# Patient Record
Sex: FEMALE | Race: WHITE | NOT HISPANIC OR LATINO | Employment: STUDENT | URBAN - METROPOLITAN AREA
[De-identification: names, ages, dates, MRNs, and addresses within clinical notes are randomized per-mention and may not be internally consistent; named-entity substitution may affect disease eponyms.]

---

## 2024-11-06 ENCOUNTER — OFFICE VISIT (OUTPATIENT)
Dept: NEUROLOGY | Facility: CLINIC | Age: 24
End: 2024-11-06

## 2024-11-06 ENCOUNTER — TELEPHONE (OUTPATIENT)
Dept: NEUROLOGY | Facility: CLINIC | Age: 24
End: 2024-11-06

## 2024-11-06 VITALS
OXYGEN SATURATION: 98 % | SYSTOLIC BLOOD PRESSURE: 120 MMHG | DIASTOLIC BLOOD PRESSURE: 88 MMHG | HEIGHT: 67 IN | BODY MASS INDEX: 27.31 KG/M2 | HEART RATE: 91 BPM | TEMPERATURE: 98.6 F | WEIGHT: 174 LBS

## 2024-11-06 DIAGNOSIS — Z87.820 HISTORY OF CONCUSSION: ICD-10-CM

## 2024-11-06 DIAGNOSIS — F41.9 ANXIETY DISORDER: ICD-10-CM

## 2024-11-06 DIAGNOSIS — R53.83 FATIGUE: ICD-10-CM

## 2024-11-06 DIAGNOSIS — H93.19 TINNITUS: ICD-10-CM

## 2024-11-06 DIAGNOSIS — R42 DIZZINESS: ICD-10-CM

## 2024-11-06 DIAGNOSIS — G43.709 CHRONIC MIGRAINE WITHOUT AURA WITHOUT STATUS MIGRAINOSUS, NOT INTRACTABLE: Primary | ICD-10-CM

## 2024-11-06 PROCEDURE — 99205 OFFICE O/P NEW HI 60 MIN: CPT | Performed by: STUDENT IN AN ORGANIZED HEALTH CARE EDUCATION/TRAINING PROGRAM

## 2024-11-06 RX ORDER — FREMANEZUMAB-VFRM 225 MG/1.5ML
225 INJECTION SUBCUTANEOUS
COMMUNITY
End: 2024-11-06 | Stop reason: SDUPTHER

## 2024-11-06 RX ORDER — PROPRANOLOL HYDROCHLORIDE 60 MG/1
60 CAPSULE, EXTENDED RELEASE ORAL
Qty: 30 CAPSULE | Refills: 6 | Status: SHIPPED | OUTPATIENT
Start: 2024-11-06

## 2024-11-06 RX ORDER — FLUOXETINE 40 MG/1
40 CAPSULE ORAL DAILY
COMMUNITY
Start: 2024-08-23 | End: 2025-08-23

## 2024-11-06 RX ORDER — ONDANSETRON 4 MG/1
4 TABLET, ORALLY DISINTEGRATING ORAL EVERY 8 HOURS PRN
COMMUNITY
Start: 2024-10-30

## 2024-11-06 RX ORDER — NORGESTREL AND ETHINYL ESTRADIOL 0.3-0.03MG
1 KIT ORAL DAILY
COMMUNITY

## 2024-11-06 RX ORDER — MECLIZINE HYDROCHLORIDE 25 MG/1
25 TABLET ORAL 3 TIMES DAILY PRN
COMMUNITY
Start: 2024-10-30 | End: 2025-10-30

## 2024-11-06 RX ORDER — FREMANEZUMAB-VFRM 225 MG/1.5ML
225 INJECTION SUBCUTANEOUS
Qty: 1.5 ML | Refills: 11 | Status: SHIPPED | OUTPATIENT
Start: 2024-11-06

## 2024-11-06 NOTE — PROGRESS NOTES
Neurology Ambulatory Visit  Name: Aissatou Aguillon       : 2000       MRN: [id number]   Encounter Provider: Jam Huizar DO   Encounter Date: 2024  Encounter department: [facility]    Aissatou Aguillon is a 24 y.o.  right handed female with a past medical history significant for migraines, who is presenting to the Neurology office for evaluation of headaches.    Assessment and Plan  1. Chronic migraine without aura without status migrainosus, not intractable  Assessment & Plan:  Aissatou is here today for evaluation of chronic migraines that have been lingering for many years.  Her headaches first started approximately 8 years ago after her first concussion.  Of note she has a family history significant for migraines and multiple family members.  It is likely that this injury brought on a headache disorder in her.  She was previously following with the [facility] neurology clinic, but is here today for second opinion.  We discussed a variety of potential options, but she preferred to continue Ajovy monthly injections and I recommended that we add on propranolol nightly.  She will update me in approximately 4 weeks at which point we can consider increasing the dose if needed.  From an abortive standpoint, she will continue with over-the-counter medications at this time.  She has a variety of ongoing symptoms including dizziness and tinnitus.  She is currently being worked up with an MRI and ENT evaluation which I think is appropriate.  It would be beneficial for her to obtain a VNG with ENT as I am suspicious about something like PPPD versus her dizziness being related to persistent headaches.  Furthermore, she reports being significantly fatigued which again could be from her daily headaches, but may also be related to another underlying issue.  She is pending a sleep study as well.  Orders:  -     propranolol (INDERAL LA) 60 mg 24 hr capsule; Take 1 capsule (60 mg total) by mouth  "daily at bedtime  -     Ajovy 225 MG/1.5ML prefilled syringe; Inject 1.5 mL (225 mg total) under the skin every 30 (thirty) days  2. History of concussion  -     Iron Panel (Includes Ferritin, Iron Sat%, Iron, and TIBC); Future  -     Folate; Future  3. Anxiety disorder  4. Fatigue  -     Iron Panel (Includes Ferritin, Iron Sat%, Iron, and TIBC); Future  -     Folate; Future  5. Tinnitus  6. Dizziness      -We have discussed concussions and the natural course of recovery. We have discussed that symptoms from a concussion typically take 2 weeks to resolve, and although sometimes it can feel like concussion symptoms linger on, at this point these symptoms would be related to contributing factors. We also discussed that the course may wax and wane.  - Contributing factors may include:   Prolonged removal from normal routine,  posttraumatic headache,  comorbid injuries, preexisting chronic headaches or migraines, cervicogenic headache, medication overuse headache, preexisting learning disability, history of concussion with prolonged recovery, anxiety or depression, stress, deconditioning,  comorbid medical diagnoses, young age.   - I have recommended gradual return of normal cognitive and physical activity with safety precautions  - We discussed that newer research regarding concussion shows that the sooner one returns gradually to their normal physical and cognitive routine, the sooner one tends to recover. Prolonged removal from normal routine and deconditioning have been shown to prolong symptoms and worsen depression.   - We discussed that sometimes there is a constellation of symptoms that some refer to as \"post concussion syndrome,\" but I prefer not to use this term since that can be misleading and make people think they are still brain injured or \"concussed,\" when the most common and likely etiology this far out from the head trauma is either contributing factors or a form of functional neurologic disorder with " mixed symptoms, especially after a thorough workup to rule out other etiologies since concussion would not be the direct cause at this point.   - We discussed how cognitive issues can have multiple causes and often related to multifactorial etiologies including stress, anxiety,  mood, pain, hypervigilance  and sleep issues and provided reassurance that, it is not likely the cognitive dysfunction is related to concussion at this point.   - Safe driving precautions, should not drive at all if feeling sleepy or cognitively not well.      Workup:  - Neurologic assessment reveals unremarkable neurological exam.  - MRI brain without contrast June 2022: No acute intracranial findings.  No white matter changes.  - MRI brain and IAC  - Sleep study  - ENT eval  - Labs: Iron panel, Folate (plus [facility] ordered labs)    Preventative:  - we discussed headache hygiene and lifestyle factors that may improve headaches  - Propranolol 60mg HS  - Currently on through other providers: Ajovy  - Past/ failed/contraindicated: Topamax (side effects), Nortriptyline (side effects), allergy to Depakote  - future options: Memantine, Diamox, CGRP med, botox    Acute:  - discussed not taking over-the-counter or prescription pain medications more than 3 days per week to prevent medication overuse/rebound headache  - Currently on through other providers: Zofran  - Past/ failed/contraindicated: Sumatriptan (somewhat effective), Rizatriptan (more effective than Sumatriptan), promethazine, Zofran, Compazine, Ubrelvy (helped a bit)  - future options:  Triptan, prochlorperazine, Toradol IM or p.o., could consider trial of dexamethasone 2 mg daily for prolonged migraine, ubrelvy, reyvow, nurtec, zavzpret  Patient instructions   Additional Testing:   - MRI brain  - Sleep study  - Labs: Iron panel, Folate  - Discuss VNG with ENT    Headache Calendar  Please maintain a headache calendar  Consider using phone applications such as Migraine Claude or [employer]i  Migraine Tracker    Headache/migraine treatment:   Acute medications (for immediate treatment of a headache):   It is ok to take ibuprofen, acetaminophen or naproxen (Advil, Tylenol,  Aleve, Excedrin) if they help your headaches you should limit these to No more than 2-3 times a week to avoid medication overuse/rebound headaches.     Prescription preventive medications for headaches/migraines   Propranolol - 60mg at bedtime  - Update me in about 4 weeks  - Continue Ajovy    *Typically these types of medications take time until you see the benefit, although some may see improvement in days, often it may take weeks, especially if the medication is being titrated up to a beneficial level. Please contact us if there are any concerns or questions regarding the medication.     Lifestyle Recommendations:  [x] SLEEP - Maintain a regular sleep schedule: Adults need at least 7-8 hours of uninterrupted a night. Maintain good sleep hygiene:  Going to bed and waking up at consistent times, avoiding excessive daytime naps, avoiding caffeinated beverages in the evening, avoid excessive stimulation in the evening and generally using bed primarily for sleeping.  One hour before bedtime would recommend turning lights down lower, decreasing your activity (may read quietly, listen to music at a low volume). When you get into bed, should eliminate all technology (no texting, emailing, playing with your phone, iPad or tablet in bed).  [x] HYDRATION - Maintain good hydration.  Drink  2L of fluid a day (4 typical small water bottles)  [x] DIET - Maintain good nutrition. In particular don't skip meals and try and eat healthy balanced meals regularly.  [x] TRIGGERS - Look for other triggers and avoid them: Limit caffeine to 1-2 cups a day or less. Avoid dietary triggers that you have noticed bring on your headaches (this could include aged cheese, peanuts, MSG, aspartame and nitrates).  [x] EXERCISE - physical exercise as we all know is good  for you in many ways, and not only is good for your heart, but also is beneficial for your mental health, cognitive health and  chronic pain/headaches. I would encourage at the least 5 days of physical exercise weekly for at least 30 minutes.     Education and Follow-up  [x] Please call with any questions or concerns. Of course if any new concerning symptoms go to the emergency department.  [x] Follow up in 3 months  History of Present Illness:       Pertinent history:  -Patient previously seen by [facility] neurology.  Most recently seen in May 2024.   -She was also seen in her family medicine clinic on 10/30/2024.  Reported a 2-week history of dizziness and ringing in her ear.  Recommended ENT evaluation, ordered labs and imaging  -Of note, she has a prior history of concussion.  Most recent injury was June 2019 while playing volleyball.  She followed with [facility] concussion following that    Headaches started at what age? 16 years old  How often do the headaches occur?   - as of 11/6/2024: 25/30 (of those, about 8 are more severe)  What time of the day do the headaches start?  Morning  How long do the headaches last? All day to a few days  Are you ever headache free? Yes    HIT-6 (60> Severe impact on life, 56-59 substantial impact on your life, 50-55 some impact, <49 little to no impact on your life.)  When you have headaches, how often is the pain severe?  Never (6), Rarely (8), Sometimes (10), Very often (11), Always (13)  2.    How often do headaches limit your ability to do usual daily activities including household work, work, school, or social activities?  Never (6), Rarely (8), Sometimes (10), Very often (11), Always (13)  3.    When you have a headache, how often do you wish you could lie down?  Never (6), Rarely (8), Sometimes (10), Very often (11), Always (13)  4.     In the past 4 weeks, how often have you felt too tired to do work or daily activities because of your headaches?  Never (6),  Rarely (8), Sometimes (10), Very often (11), Always (13)  5.     In the past 4 weeks, how often have you felt fed up or irritated because of your headaches?  Never (6), Rarely (8), Sometimes (10), Very often (11), Always (13)  6.     In the past 4 weeks, how often did headaches limit your ability to concentrate on work or daily activities?  Never (6), Rarely (8), Sometimes (10), Very often (11), Always (13)  Total: 72    Aura/warning? No    Last eye exam: Summer 2024 - non-dilated. Normal    Where is your headache located?   frontalis and diffuse    Describe your usual headache   Throbbing, Pounding, and Sharp    What is the intensity of pain?   Worst 8/10, Average: 6-7/10    Associated symptoms:   [x] Nausea       [x] Vomiting (previously)       [x] Diarrhea  [x] Photophobia     [x] Osmophobia  [x] Prefer quiet, dark room  [x] Light-headed or dizzy. Ongoing since her first concussion in 2016. Feels as though she is swaying. Feels out of it at times. In the morning, she will feel as if she is on a boat  [x] Tinnitus - left ear  [x] Hands or feet tingle or feel numb/paresthesias      What medications do you take or have you taken for your headaches?   ABORTIVE:    OTC medications: Advil, Tylenol (about 1-2 times per week)  Prescription: Zofran    Past/ failed/contraindicated:  OTC medications: None  Prescription: Sumatriptan (somewhat effective), Rizatriptan (more effective than Sumatriptan), promethazine, Zofran, Compazine, Ubrelvy (helped a bit)    PREVENTIVE:   Ajovy (3-5 years), Fluoxetine    Past/ failed/contraindicated:  Topamax (side effects), Nortriptyline (side effects), allergy to Depakote    Alternative therapies used in the past for headaches? [] Massage   [] Physical therapy   [] Chiropractor [] Acupuncture [] Acupressure   [] CBT  [] Biofeedback  [x] None    Headache are worse with: [] Coughing, [] Sneezing, [] Bending over, [] Exertion, [x] Nothing in particular    Headache triggers:  None    What time  "of the year do headaches occur more frequently? do not seem to be related to any time of the year  Have you seen someone else for headaches or pain? Yes, neurology  Have you had trigger point injection performed and how often? No  Have you had Botox injection performed and how often? No   Have you had epidural injections or transforaminal injections performed? No    Are you current pregnant or planning on getting pregnant? No    Have you ever had any Brain imaging? yes; I personally reviewed these images.  -MRI brain without contrast June 2022: No acute intracranial findings.  No white matter changes.  Pertinent labs: None    Sleep History  Prior Sleep study:  No  - Scheduled next week - in-lab    Is your sleep restful? No  Do you wake up with headaches? Yes  How many hours do you actually sleep? About 9  Do you snore while asleep? Yes  Have you been told that you stop breathing during sleeping? No  Do you wake up tired in the morning? Yes  Do you take frequent naps during the day? Yes  Do you have jaw pain? Yes  Do you grind/clench your teeth at night? Yes    Psychiatric History:  Anxiety: Yes  Depression: No  Psychiatric Admissions: No  Follow with psychiatry/psychology: Yes - therapist    Lifestyle:  Physical activity: \"When I feel well enough to\" - not lately due to dizziness  Water: about 64-80oz per day  Caffeine: None    Pertinent family history:  Family history of headaches:  migraine headaches in mother, aunt, and uncle  Any family history of aneurysms - No    Pertinent social history:  Work: Special   Education: Bachelors  Lives with parents    Illicit Drugs: denies  Alcohol/tobacco: Denies tobacco use, alcohol intake: social drinker  Review of Systems:   Constitutional:  Negative for appetite change, fatigue and fever.   HENT:  Positive for tinnitus (left ear). Negative for hearing loss, trouble swallowing and voice change.    Eyes:  Positive for photophobia. Negative for pain and visual " "disturbance.   Respiratory: Negative.  Negative for shortness of breath.    Cardiovascular: Negative.  Negative for palpitations.   Gastrointestinal:  Positive for nausea. Negative for vomiting.   Endocrine: Negative.  Negative for cold intolerance.   Genitourinary: Negative.  Negative for dysuria, frequency and urgency.   Musculoskeletal:  Positive for gait problem (balance issues). Negative for back pain, myalgias, neck pain and neck stiffness.   Skin: Negative.  Negative for rash.   Allergic/Immunologic: Negative.    Neurological:  Positive for dizziness (constant) and headaches (30/30 HA - 8 migraines/month - 1-2/7 weekly advil/tylenol). Negative for tremors, seizures, syncope, facial asymmetry, speech difficulty, weakness, light-headedness and numbness.   Hematological: Negative.  Does not bruise/bleed easily.   Psychiatric/Behavioral: Negative.  Negative for confusion, hallucinations and sleep disturbance.    All other systems reviewed and are negative.  Objective:                                                                  Vitals:            Constitutional:    /88 (BP Location: Right arm, Patient Position: Sitting, Cuff Size: Standard)   Pulse 91   Temp 98.6 °F (37 °C) (Temporal)   Ht 5' 7\" (1.702 m)   Wt 78.9 kg (174 lb)   SpO2 98%   BMI 27.25 kg/m²   BP Readings from Last 3 Encounters:   11/06/24 120/88     Pulse Readings from Last 3 Encounters:   11/06/24 91         Well developed, well nourished, well groomed. No dysmorphic features.       HEENT:  Normocephalic atraumatic.   Oropharynx is clear and moist. No oral mucosal lesions.   Chest:  Respirations regular and unlabored.    Cardiovascular:  Distal extremities warm without palpable edema or tenderness, no observed significant swelling.    Musculoskeletal:  (see below under neurologic exam for evaluation of motor function and gait)   Skin:  warm and dry, not diaphoretic. No apparent birthmarks or stigmata of neurocutaneous disease. "   Psychiatric:  Normal behavior and appropriate affect     Neurological Examination:     Mental status/cognitive function:   Orientated to time, place and person. Recent and remote memory intact. Attention span and concentration as well as fund of knowledge are appropriate for age. Normal language and spontaneous speech.    Cranial Nerves:  II-visual fields full.   Fundi poorly visualized due to pupillary constriction  III, IV, VI-Pupils were equal, round, and reactive to light and accomodation. Extraocular movements were full and conjugate without nystagmus.  Conjugate gaze, normal smooth pursuits, normal saccades   V-facial sensation symmetric.    VII-facial expression symmetric, intact forehead wrinkle, strong eye closure, symmetric smile    VIII-hearing grossly intact bilaterally   IX, X-palate elevation symmetric, no dysarthria.   XI-shoulder shrug strength intact    XII-tongue protrusion midline.    Motor Exam: symmetric bulk and tone throughout, no pronator drift. Power/strength 5/5 bilateral upper and lower extremities, no atrophy, fasciculations or abnormal movements noted.   Sensory: grossly intact light touch in all extremities.   Reflexes: brachioradialis 2+, biceps 2+, knee 2+, ankle 2+ bilaterally. No ankle clonus  Coordination: Finger nose finger intact bilaterally, no apparent dysmetria, ataxia or tremor noted  Gait: steady casual and tandem gait.     I have spent 55 minutes with the patient today in which greater than 50% of this time was spent in counseling/coordination of care regarding Diagnostic results, Prognosis, Risks and benefits of tx options, Patient and family education, Importance of tx compliance, Impressions, Documenting in the medical record, Reviewing / ordering tests, medicine, procedures  , and Obtaining or reviewing history  . I also spent 15 minutes non face to face for this patient the same day.     Activity Minutes   Precharting/reviewing 10   Patient care/counseling 55    Postcharting/care coordination 5     Voice recognition software was used in the generation of this note. There may be unintentional errors including grammatical errors, spelling errors, or pronoun errors.

## 2024-11-06 NOTE — PROGRESS NOTES
Review of Systems   Constitutional:  Negative for appetite change, fatigue and fever.   HENT:  Positive for tinnitus (left ear). Negative for hearing loss, trouble swallowing and voice change.    Eyes:  Positive for photophobia. Negative for pain and visual disturbance.   Respiratory: Negative.  Negative for shortness of breath.    Cardiovascular: Negative.  Negative for palpitations.   Gastrointestinal:  Positive for nausea. Negative for vomiting.   Endocrine: Negative.  Negative for cold intolerance.   Genitourinary: Negative.  Negative for dysuria, frequency and urgency.   Musculoskeletal:  Positive for gait problem (balance issues). Negative for back pain, myalgias, neck pain and neck stiffness.   Skin: Negative.  Negative for rash.   Allergic/Immunologic: Negative.    Neurological:  Positive for dizziness (constant) and headaches (30/30 HA - 8 migraines/month - 1-2/7 weekly advil/tylenol). Negative for tremors, seizures, syncope, facial asymmetry, speech difficulty, weakness, light-headedness and numbness.   Hematological: Negative.  Does not bruise/bleed easily.   Psychiatric/Behavioral: Negative.  Negative for confusion, hallucinations and sleep disturbance.    All other systems reviewed and are negative.

## 2024-11-06 NOTE — TELEPHONE ENCOUNTER
Patient called was able to schedule sooner appointment with Dr Huizar today 11/6 @ 330 pm.  Appt Confirmed

## 2024-11-06 NOTE — PATIENT INSTRUCTIONS
Additional Testing:   - MRI brain  - Sleep study  - Labs: Iron panel, Folate  - Discuss VNG with ENT    Headache Calendar  Please maintain a headache calendar  Consider using phone applications such as Migraine Claude or [place] Migraine Tracker    Headache/migraine treatment:   Acute medications (for immediate treatment of a headache):   It is ok to take ibuprofen, acetaminophen or naproxen (Advil, Tylenol,  Aleve, Excedrin) if they help your headaches you should limit these to No more than 2-3 times a week to avoid medication overuse/rebound headaches.     Prescription preventive medications for headaches/migraines   Propranolol - 60mg at bedtime  - Update me in about 4 weeks  - Continue Ajovy    *Typically these types of medications take time until you see the benefit, although some may see improvement in days, often it may take weeks, especially if the medication is being titrated up to a beneficial level. Please contact us if there are any concerns or questions regarding the medication.     Lifestyle Recommendations:  [x] SLEEP - Maintain a regular sleep schedule: Adults need at least 7-8 hours of uninterrupted a night. Maintain good sleep hygiene:  Going to bed and waking up at consistent times, avoiding excessive daytime naps, avoiding caffeinated beverages in the evening, avoid excessive stimulation in the evening and generally using bed primarily for sleeping.  One hour before bedtime would recommend turning lights down lower, decreasing your activity (may read quietly, listen to music at a low volume). When you get into bed, should eliminate all technology (no texting, emailing, playing with your phone, iPad or tablet in bed).  [x] HYDRATION - Maintain good hydration.  Drink  2L of fluid a day (4 typical small water bottles)  [x] DIET - Maintain good nutrition. In particular don't skip meals and try and eat healthy balanced meals regularly.  [x] TRIGGERS - Look for other triggers and avoid them: Limit  caffeine to 1-2 cups a day or less. Avoid dietary triggers that you have noticed bring on your headaches (this could include aged cheese, peanuts, MSG, aspartame and nitrates).  [x] EXERCISE - physical exercise as we all know is good for you in many ways, and not only is good for your heart, but also is beneficial for your mental health, cognitive health and  chronic pain/headaches. I would encourage at the least 5 days of physical exercise weekly for at least 30 minutes.     Education and Follow-up  [x] Please call with any questions or concerns. Of course if any new concerning symptoms go to the emergency department.  [x] Follow up in 3 months

## 2024-11-06 NOTE — TELEPHONE ENCOUNTER
LMOM for pt and offered sooner appt today 11/6 at 12:30p. Gave c/b # if still available. Otherwise they will remain on the waitlist.

## 2024-11-06 NOTE — ASSESSMENT & PLAN NOTE
Aissatou is here today for evaluation of chronic migraines that have been lingering for many years.  Her headaches first started approximately 8 years ago after her first concussion.  Of note she has a family history significant for migraines and multiple family members.  It is likely that this injury brought on a headache disorder in her.  She was previously following with the [facility] neurology clinic, but is here today for second opinion.  We discussed a variety of potential options, but she preferred to continue Ajovy monthly injections and I recommended that we add on propranolol nightly.  She will update me in approximately 4 weeks at which point we can consider increasing the dose if needed.  From an abortive standpoint, she will continue with over-the-counter medications at this time.  She has a variety of ongoing symptoms including dizziness and tinnitus.  She is currently being worked up with an MRI and ENT evaluation which I think is appropriate.  It would be beneficial for her to obtain a VNG with ENT as I am suspicious about something like PPPD versus her dizziness being related to persistent headaches.  Furthermore, she reports being significantly fatigued which again could be from her daily headaches, but may also be related to another underlying issue.  She is pending a sleep study as well.

## 2024-11-08 DIAGNOSIS — E53.8 B12 DEFICIENCY: Primary | ICD-10-CM

## 2024-11-08 LAB
25(OH)D3+25(OH)D2 SERPL-MCNC: 39 NG/ML (ref 30–100)
ALBUMIN SERPL-MCNC: 4.5 G/DL (ref 3.5–5.7)
ALP SERPL-CCNC: 48 U/L (ref 35–120)
ALT SERPL-CCNC: 14 U/L
ANION GAP SERPL CALCULATED.3IONS-SCNC: 6 MMOL/L (ref 3–11)
AST SERPL-CCNC: 14 U/L
BASOPHILS # BLD AUTO: 0 THOU/CMM (ref 0–0.1)
BASOPHILS NFR BLD AUTO: 0 %
BILIRUB SERPL-MCNC: 0.5 MG/DL (ref 0.2–1)
BUN SERPL-MCNC: 18 MG/DL (ref 7–25)
CALCIUM SERPL-MCNC: 9.6 MG/DL (ref 8.5–10.5)
CHLORIDE SERPL-SCNC: 102 MMOL/L (ref 100–109)
CO2 SERPL-SCNC: 32 MMOL/L (ref 21–31)
CREAT SERPL-MCNC: 0.75 MG/DL (ref 0.4–1.1)
CYTOLOGY CMNT CVX/VAG CYTO-IMP: ABNORMAL
DIFFERENTIAL METHOD BLD: ABNORMAL
EOSINOPHIL # BLD AUTO: 0.5 THOU/CMM (ref 0–0.5)
EOSINOPHIL NFR BLD AUTO: 6 %
ERYTHROCYTE [DISTWIDTH] IN BLOOD BY AUTOMATED COUNT: 12.3 % (ref 12–16)
ERYTHROCYTE [SEDIMENTATION RATE] IN BLOOD BY WESTERGREN METHOD: 7 MM/HR (ref 0–20)
FERRITIN SERPL-MCNC: 27 NG/ML (ref 11–306.8)
FOLATE SERPL-MCNC: 4.4 NG/ML
GFR/BSA.PRED SERPLBLD CYS-BASED-ARV: 113 ML/MIN/{1.73_M2}
GLUCOSE SERPL-MCNC: 84 MG/DL (ref 65–99)
HCT VFR BLD AUTO: 41.1 % (ref 35–43)
HGB BLD-MCNC: 14 G/DL (ref 11.5–14.5)
IRON SATN MFR SERPL: 33 % (ref 20–50)
IRON SERPL-MCNC: 140 UG/DL (ref 50–212)
LYMPHOCYTES # BLD AUTO: 3.1 THOU/CMM (ref 1–3)
LYMPHOCYTES NFR BLD AUTO: 37 %
MCH RBC QN AUTO: 30.5 PG (ref 26–34)
MCHC RBC AUTO-ENTMCNC: 34.1 G/DL (ref 32–37)
MCV RBC AUTO: 90 FL (ref 80–100)
MONOCYTES # BLD AUTO: 0.6 THOU/CMM (ref 0.3–1)
MONOCYTES NFR BLD AUTO: 7 %
NEUTROPHILS # BLD AUTO: 4.2 THOU/CMM (ref 1.8–7.8)
NEUTROPHILS NFR BLD AUTO: 50 %
PLATELET # BLD AUTO: 279 THOU/CMM (ref 140–350)
PMV BLD REES-ECKER: 8.8 FL (ref 7.5–11.3)
POTASSIUM SERPL-SCNC: 4.3 MMOL/L (ref 3.5–5.2)
PROT SERPL-MCNC: 6.9 G/DL (ref 6.3–8.3)
RBC # BLD AUTO: 4.59 MILL/CMM (ref 3.7–4.7)
SODIUM SERPL-SCNC: 140 MMOL/L (ref 135–145)
TIBC SERPL-MCNC: 420 UG/DL (ref 260–430)
TRANSFERRIN SERPL-MCNC: 300 MG/DL (ref 203–362)
TSH SERPL-ACNC: 1.13 UIU/ML (ref 0.45–5.33)
VIT B12 SERPL-MCNC: 160 PG/ML (ref 180–914)
WBC # BLD AUTO: 8.4 THOU/CMM (ref 4–10)

## 2024-11-13 ENCOUNTER — RESULTS FOLLOW-UP (OUTPATIENT)
Dept: OTHER | Facility: HOSPITAL | Age: 24
End: 2024-11-13

## 2024-11-13 ENCOUNTER — RESULTS FOLLOW-UP (OUTPATIENT)
Dept: NEUROLOGY | Facility: CLINIC | Age: 24
End: 2024-11-13

## 2024-11-13 NOTE — TELEPHONE ENCOUNTER
----- Message from Jam Huizar DO sent at 11/11/2024  2:36 PM EST -----  Please let patient know that her B12 level was significantly low.  I would recommend over-the-counter supplements of 1000 mcg daily, but we can also incorporate injections weekly for about 4 to 6 weeks followed by monthly for a few months.  If she is interested in injections please let me know and I will put in a prescription.  These can also be completed by her PCP.

## 2024-11-14 RX ORDER — CYANOCOBALAMIN 1000 UG/ML
INJECTION, SOLUTION INTRAMUSCULAR; SUBCUTANEOUS
Qty: 10 ML | Refills: 0 | Status: SHIPPED | OUTPATIENT
Start: 2024-11-14

## 2024-11-14 RX ORDER — NEEDLES, DISPOSABLE 25GX5/8"
NEEDLE, DISPOSABLE MISCELLANEOUS
Qty: 10 EACH | Refills: 0 | Status: SHIPPED | OUTPATIENT
Start: 2024-11-14

## 2024-11-14 NOTE — TELEPHONE ENCOUNTER
Called and advised pt of the below. She verbalized understanding. Pt states that she feels comfortable doing b12 inj at home. Requesting script be sent to [facility] pharmacy on file

## 2024-11-15 ENCOUNTER — TELEPHONE (OUTPATIENT)
Dept: NEUROLOGY | Facility: CLINIC | Age: 24
End: 2024-11-15

## 2024-11-15 DIAGNOSIS — E53.8 B12 DEFICIENCY: Primary | ICD-10-CM

## 2024-11-15 RX ORDER — SYRINGE W-NEEDLE,DISPOSAB,3 ML 25GX5/8"
SYRINGE, EMPTY DISPOSABLE MISCELLANEOUS
Qty: 10 EACH | Refills: 0 | Status: SHIPPED | OUTPATIENT
Start: 2024-11-15

## 2024-11-15 NOTE — TELEPHONE ENCOUNTER
Patient called the RX Refill Line. Message is being forwarded to the office.     Patient states she needs syringes sent to [facility] to do [facility]-12 injections only needs were sent.    Please contact patient at [phone]

## 2024-11-28 DIAGNOSIS — G43.709 CHRONIC MIGRAINE WITHOUT AURA WITHOUT STATUS MIGRAINOSUS, NOT INTRACTABLE: ICD-10-CM

## 2024-11-29 RX ORDER — PROPRANOLOL HYDROCHLORIDE 60 MG/1
60 CAPSULE, EXTENDED RELEASE ORAL
Qty: 90 CAPSULE | Refills: 1 | Status: SHIPPED | OUTPATIENT
Start: 2024-11-29

## 2024-12-20 ENCOUNTER — TELEPHONE (OUTPATIENT)
Dept: NEUROLOGY | Facility: CLINIC | Age: 24
End: 2024-12-20

## 2024-12-20 DIAGNOSIS — G43.709 CHRONIC MIGRAINE WITHOUT AURA WITHOUT STATUS MIGRAINOSUS, NOT INTRACTABLE: ICD-10-CM

## 2024-12-20 RX ORDER — FREMANEZUMAB-VFRM 225 MG/1.5ML
225 INJECTION SUBCUTANEOUS
Qty: 1.5 ML | Refills: 0 | Status: CANCELLED | OUTPATIENT
Start: 2024-12-20

## 2024-12-20 NOTE — TELEPHONE ENCOUNTER
Patient called the rx line. Patient said the prescription for Ajovy 225 MG/1.5ML prefilled syringe was denied. I told the patient she has 11 refills left from the script that was sent in November. Patient will call the pharmacy and try to speak to a person. If any issues, she will call us back.

## 2024-12-20 NOTE — TELEPHONE ENCOUNTER
Patient checked with pharmacy, they report not having refills available.     Reason for call:   [x] Refill   [] Prior Auth  [] Other:     Office:   [x] PCP/Provider - Jam Huizar,   [] Specialty/Provider -     Medication: Ajovy 225 MG/1.5ML prefilled syringe     Dose/Frequency: Inject 1.5 mL (225 mg total) under the skin every 30 (thirty) days     Quantity: 1.5ml    [facility] [address]    Does the patient have enough for 3 days?   [] Yes   [x] No - Send as HP to POD

## 2024-12-20 NOTE — TELEPHONE ENCOUNTER
Reason for call:   [x] Prior Auth-patient unable to get medication filled at pharmacy. RF on file but pharmacy is telling patient that an insurance authorization is needed. Patient does not understand why as they have already received an approval for this medication and should still be able to get it.   [] Other:     Caller:  [x] Patient  [] Pharmacy  Name:   Address:   Callback Number:     Medication: Ajovy 225 MG/1.5ML prefilled syringe     Dose/Frequency: Inject 1.5 mL (225 mg total) under the skin every 30 (thirty) days     Quantity: 1.5mL    Ordering Provider:   [] PCP/Provider -   [x] Speciality/Provider - [facility] / Jam Huizar, DO

## 2024-12-23 DIAGNOSIS — G43.709 CHRONIC MIGRAINE WITHOUT AURA WITHOUT STATUS MIGRAINOSUS, NOT INTRACTABLE: ICD-10-CM

## 2024-12-23 RX ORDER — FREMANEZUMAB-VFRM 225 MG/1.5ML
225 INJECTION SUBCUTANEOUS
Qty: 1.5 ML | Refills: 11 | Status: SHIPPED | OUTPATIENT
Start: 2024-12-23

## 2024-12-23 NOTE — TELEPHONE ENCOUNTER
Urgent [place] CAMARILLO initiated on CMM. (Key: [id number])   Meets Pharmacy Policy  Authorization Expiration Date: 12/21/2025    Called [facility] pharmacy and left a detailed message on their answering machine making them aware of the approval and  for a call back if any questions    Pt made aware and verbalized understanding

## 2024-12-23 NOTE — TELEPHONE ENCOUNTER
Reason for call:   [x] Refill   [] Prior Auth  [x] Other: Pt told by pharmacy no more refills     Office:   [] PCP/Provider -   [x] Specialty/Provider - [facility]     Medication: Ajovy 225 MG/1.5ML prefilled syringe Inject 1.5 mL (225 mg total) under the skin every 30 (thirty) days,       [facility] [address]     Does the patient have enough for 3 days?   [] Yes   [x] No - Send as HP to POD

## 2024-12-24 NOTE — TELEPHONE ENCOUNTER
Pt called in as she is still unable to get her Ajovy and believes its due to the PA.    After reviewing chart info. I informed pt that the PA has been approved. Patient aware I will contact [facility] to look into the reason and ask them to update her .    I spoke to Will Pharmacist at [facility]. He informed they are able to fill it, but waiting for it to be delivered, if not today, they will have it by Friday. He will call pt to notify her as well

## 2025-01-05 DIAGNOSIS — E53.8 B12 DEFICIENCY: ICD-10-CM

## 2025-01-07 RX ORDER — CYANOCOBALAMIN 1000 UG/ML
INJECTION, SOLUTION INTRAMUSCULAR; SUBCUTANEOUS
Qty: 6 ML | Refills: 1 | Status: SHIPPED | OUTPATIENT
Start: 2025-01-07

## 2025-03-19 ENCOUNTER — TELEPHONE (OUTPATIENT)
Age: 25
End: 2025-03-19

## 2025-03-19 ENCOUNTER — TELEMEDICINE (OUTPATIENT)
Dept: NEUROLOGY | Facility: CLINIC | Age: 25
End: 2025-03-19
Payer: COMMERCIAL

## 2025-03-19 DIAGNOSIS — E53.8 B12 DEFICIENCY: ICD-10-CM

## 2025-03-19 DIAGNOSIS — Z87.820 HISTORY OF CONCUSSION: ICD-10-CM

## 2025-03-19 DIAGNOSIS — R53.83 FATIGUE: ICD-10-CM

## 2025-03-19 DIAGNOSIS — F41.9 ANXIETY DISORDER: ICD-10-CM

## 2025-03-19 DIAGNOSIS — G43.709 CHRONIC MIGRAINE WITHOUT AURA WITHOUT STATUS MIGRAINOSUS, NOT INTRACTABLE: Primary | ICD-10-CM

## 2025-03-19 PROCEDURE — 99214 OFFICE O/P EST MOD 30 MIN: CPT | Performed by: STUDENT IN AN ORGANIZED HEALTH CARE EDUCATION/TRAINING PROGRAM

## 2025-03-19 RX ORDER — RIMEGEPANT SULFATE 75 MG/75MG
TABLET, ORALLY DISINTEGRATING ORAL
Qty: 16 TABLET | Refills: 6 | Status: SHIPPED | OUTPATIENT
Start: 2025-03-19

## 2025-03-19 RX ORDER — PROPRANOLOL HYDROCHLORIDE 60 MG/1
60 CAPSULE, EXTENDED RELEASE ORAL
Qty: 90 CAPSULE | Refills: 1 | Status: SHIPPED | OUTPATIENT
Start: 2025-03-19

## 2025-03-19 NOTE — ASSESSMENT & PLAN NOTE
At today's visit, she reports that she is doing exceptionally well.  With the addition of propranolol, she has found that her headaches have dramatically improved.  She also continues with Ajovy monthly injections.  She does not have any prescription abortive, so she was open to trying Nurtec as needed for the more severe episodes.  If she were to get a headache, she wakes up with it in the morning.  We discussed her sleep study results and how when she is supine she has more apneic events, but overall did not have significant enough events to qualify for sleep apnea.  Nonetheless, I suggest that she try side sleeping to see if she wakes up with less morning headaches.  If this persists, we can always consider increasing the dose of the propranolol to 80 mg nightly.  In terms of dizziness, this is also significantly improved, so it is possible that it was connected to her frequent headache days.

## 2025-03-19 NOTE — PATIENT INSTRUCTIONS
Headache Calendar  Please maintain a headache calendar  Consider using phone applications such as Migraine Claude or [facility] Migraine Tracker    Headache/migraine treatment:   Acute medications (for immediate treatment of a headache):   It is ok to take ibuprofen, acetaminophen or naproxen (Advil, Tylenol,  Aleve, Excedrin) if they help your headaches you should limit these to No more than 2-3 times a week to avoid medication overuse/rebound headaches.     Prescription Abortive  Take one NURTEC 75 mg at onset under tongue. Limit 1 in 24 hours.  Go online to [email or web address] and sign up for coupon card.     Prescription preventive medications for headaches/migraines   - Propranolol - 60mg at bedtime  - Ajovy - monthly injection    Lifestyle Recommendations:  [x] SLEEP - Maintain a regular sleep schedule: Adults need at least 7-8 hours of uninterrupted a night. Maintain good sleep hygiene:  Going to bed and waking up at consistent times, avoiding excessive daytime naps, avoiding caffeinated beverages in the evening, avoid excessive stimulation in the evening and generally using bed primarily for sleeping.  One hour before bedtime would recommend turning lights down lower, decreasing your activity (may read quietly, listen to music at a low volume). When you get into bed, should eliminate all technology (no texting, emailing, playing with your phone, iPad or tablet in bed).  [x] HYDRATION - Maintain good hydration.  Drink  2L of fluid a day (4 typical small water bottles)  [x] DIET - Maintain good nutrition. In particular don't skip meals and try and eat healthy balanced meals regularly.  [x] TRIGGERS - Look for other triggers and avoid them: Limit caffeine to 1-2 cups a day or less. Avoid dietary triggers that you have noticed bring on your headaches (this could include aged cheese, peanuts, MSG, aspartame and nitrates).  [x] EXERCISE - physical exercise as we all know is good for you in many ways, and not only is good for  your heart, but also is beneficial for your mental health, cognitive health and  chronic pain/headaches. I would encourage at the least 5 days of physical exercise weekly for at least 30 minutes.     Education and Follow-up  [x] Please call with any questions or concerns. Of course if any new concerning symptoms go to the emergency department.  [x] Follow up in 6 months

## 2025-03-19 NOTE — TELEPHONE ENCOUNTER
Patient called to change appt to virtual    Warm transferred to office- gave ok to virtual    However the block it was scheduled in access center cannot override    Please advise   Thank you

## 2025-03-19 NOTE — PROGRESS NOTES
Neurology Ambulatory Visit  Name: Aissatou Aguillon       : 2000       MRN: [id number]   Encounter Provider: Jam Huizar DO   Encounter Date: 3/19/2025  Encounter department: [facility] [place]    Administrative Statements   Encounter provider Jam Huizar DO    The Patient is located at Home and in the following state in which I hold an active license [place].    The patient was identified by name and date of birth. Aissatou Aguillon was informed that this is a telemedicine visit and that the visit is being conducted through the Epic Embedded platform. She agrees to proceed..  My office door was closed. No one else was in the room.  She acknowledged consent and understanding of privacy and security of the video platform. The patient has agreed to participate and understands they can discontinue the visit at any time.    Aissatou Aguillon is a 25 y.o.  right handed female with a past medical history significant for migraines, who is returning to the Neurology office for evaluation of headaches.     Assessment and Plan  1. Chronic migraine without aura without status migrainosus, not intractable  Assessment & Plan:  At today's visit, she reports that she is doing exceptionally well.  With the addition of propranolol, she has found that her headaches have dramatically improved.  She also continues with Ajovy monthly injections.  She does not have any prescription abortive, so she was open to trying Nurtec as needed for the more severe episodes.  If she were to get a headache, she wakes up with it in the morning.  We discussed her sleep study results and how when she is supine she has more apneic events, but overall did not have significant enough events to qualify for sleep apnea.  Nonetheless, I suggest that she try side sleeping to see if she wakes up with less morning headaches.  If this persists, we can always consider increasing the dose of the propranolol to 80 mg nightly.  In terms of dizziness,  this is also significantly improved, so it is possible that it was connected to her frequent headache days.  Orders:  -     rimegepant sulfate (Nurtec) 75 mg TBDP; Take one NURTEC 75 mg at onset under tongue. Limit 1 in 24 hours  -     propranolol (INDERAL LA) 60 mg 24 hr capsule; Take 1 capsule (60 mg total) by mouth daily at bedtime  2. History of concussion  3. Anxiety disorder  4. Fatigue  5. B12 deficiency      She will Return in about 6 months (around 9/19/2025).    Workup  - Neurologic assessment reveals unremarkable neurological exam.  - MRI brain without contrast June 2022: No acute intracranial findings.  No white matter changes.  - MRI brain and IAC 11/19/2024: Normal exam.  No vestibular schwannoma.  No postcontrast enhancement.  - Home sleep study February 2025: No ALBA  - Vitamin B12 11/8/2024: 160  - Folate 11/8/2024: 4.4     Preventative:  - we discussed headache hygiene and lifestyle factors that may improve headaches  - Propranolol 60mg HS, Ajovy monthly injection  - Currently on through other providers: Fluoxetine  - Past/ failed/contraindicated: Topamax (side effects), Nortriptyline (side effects), allergy to Depakote  - future options: Memantine, Diamox, CGRP med, botox     Acute:  - discussed not taking over-the-counter or prescription pain medications more than 3 days per week to prevent medication overuse/rebound headache  - Nurtec 75mg ODT  - Currently on through other providers: Zofran  - Past/ failed/contraindicated: Sumatriptan (somewhat effective), Rizatriptan (more effective than Sumatriptan), promethazine, Zofran, Compazine, Ubrelvy (helped a bit)  - future options:  Triptan, prochlorperazine, Toradol IM or p.o., could consider trial of dexamethasone 2 mg daily for prolonged migraine, ubrelvy, reyvow, nurtec, zavzpret    History of Present Illness       Interval updates:  3/19/25: At today's visit, she reports overall improvement in terms of her headaches.  She currently endorses about  5 headache days per month of which none are severe.  She continues with Ajovy monthly injections in combination with propranolol nightly.  From an abortive standpoint she is using over-the-counter medications 1 to 2 days/week.  With regards to her dizziness, she finds that it is improving. It is occurring less often. At most this will happen 3 times per week.     Prior History  11/6/24: Aissatou is here today for evaluation of chronic migraines that have been lingering for many years.  Her headaches first started approximately 8 years ago after her first concussion.  Of note she has a family history significant for migraines and multiple family members.  It is likely that this injury brought on a headache disorder in her.  She was previously following with the [facility] neurology clinic, but is here today for second opinion.  We discussed a variety of potential options, but she preferred to continue Ajovy monthly injections and I recommended that we add on propranolol nightly.  She will update me in approximately 4 weeks at which point we can consider increasing the dose if needed.  From an abortive standpoint, she will continue with over-the-counter medications at this time.  She has a variety of ongoing symptoms including dizziness and tinnitus.  She is currently being worked up with an MRI and ENT evaluation which I think is appropriate.  It would be beneficial for her to obtain a VNG with ENT as I am suspicious about something like PPPD versus her dizziness being related to persistent headaches.  Furthermore, she reports being significantly fatigued which again could be from her daily headaches, but may also be related to another underlying issue.  She is pending a sleep study as well.         Objective     Physical Exam:                                                                 Vitals:            Constitutional:    There were no vitals taken for this visit.  BP Readings from Last 3 Encounters:   11/06/24  120/88     Pulse Readings from Last 3 Encounters:   11/06/24 91         Well developed, well nourished, No dysmorphic features.       HEENT:  Normocephalic atraumatic. See neuro exam   Psychiatric:  Normal behavior and appropriate affect       Neurological Examination:     Mental status/cognitive function:   Recent and remote memory appear intact. Attention span and concentration as well as fund of knowledge appear appropriate for age. Normal language and spontaneous speech.  Cranial Nerves:  III, IV, VI-Pupils were equal, round. Extraocular movements appear full and conjugate   VII-facial expression symmetric  Motor Exam: symmetric bulk throughout. no atrophy, fasciculations or abnormal movements noted.   Coordination:  no apparent dysmetria, ataxia or tremor noted      Voice recognition software was used in the generation of this note. There may be unintentional errors including grammatical errors, spelling errors, or pronoun errors.

## 2025-03-19 NOTE — PROGRESS NOTES
Since your last visit are your headaches Improved    Any change to the headache type? No    What is your current headache frequency (total headache days out of 30): 5/30 (of those, how many are more severe: 0)    Are you taking your current medications as prescribed? yes      Do you have any side effects? no    How may days per week do you take an abortive medicine? 1-2/7

## 2025-03-20 ENCOUNTER — TELEPHONE (OUTPATIENT)
Dept: NEUROLOGY | Facility: CLINIC | Age: 25
End: 2025-03-20

## 2025-03-26 ENCOUNTER — TELEPHONE (OUTPATIENT)
Age: 25
End: 2025-03-26

## 2025-03-26 NOTE — TELEPHONE ENCOUNTER
----- Message from Jam Huizar DO sent at 3/19/2025  3:36 PM EDT -----  Please start prior auth for [facility]

## 2025-03-28 NOTE — TELEPHONE ENCOUNTER
Per REX, Nurte-  Meets Pharmacy Policy  Effective Date: 1/26/2025  Authorization Expiration Date: 9/25/2025    Called [facility] pharmacy and left a message making them aware of the approval and to dispense 16 tabs per 30 days

## 2025-05-11 DIAGNOSIS — E53.8 B12 DEFICIENCY: ICD-10-CM

## 2025-05-12 RX ORDER — SYRINGE WITH NEEDLE, 1 ML 25GX5/8"
SYRINGE, EMPTY DISPOSABLE MISCELLANEOUS
Qty: 10 EACH | Refills: 0 | Status: SHIPPED | OUTPATIENT
Start: 2025-05-12